# Patient Record
Sex: MALE | Race: WHITE
[De-identification: names, ages, dates, MRNs, and addresses within clinical notes are randomized per-mention and may not be internally consistent; named-entity substitution may affect disease eponyms.]

---

## 2020-01-25 ENCOUNTER — HOSPITAL ENCOUNTER (EMERGENCY)
Dept: HOSPITAL 56 - MW.ED | Age: 65
Discharge: HOME | End: 2020-01-25
Payer: COMMERCIAL

## 2020-01-25 DIAGNOSIS — J44.1: Primary | ICD-10-CM

## 2020-01-25 DIAGNOSIS — M54.6: ICD-10-CM

## 2020-01-25 DIAGNOSIS — F17.210: ICD-10-CM

## 2020-01-25 LAB
BUN SERPL-MCNC: 12 MG/DL (ref 7–18)
CHLORIDE SERPL-SCNC: 105 MMOL/L (ref 98–107)
CO2 SERPL-SCNC: 28.4 MMOL/L (ref 21–32)
GLUCOSE SERPL-MCNC: 121 MG/DL (ref 74–106)
POTASSIUM SERPL-SCNC: 4 MMOL/L (ref 3.5–5.1)
SODIUM SERPL-SCNC: 141 MMOL/L (ref 136–148)

## 2020-01-25 PROCEDURE — 93005 ELECTROCARDIOGRAM TRACING: CPT

## 2020-01-25 PROCEDURE — 36415 COLL VENOUS BLD VENIPUNCTURE: CPT

## 2020-01-25 PROCEDURE — 85025 COMPLETE CBC W/AUTO DIFF WBC: CPT

## 2020-01-25 PROCEDURE — 87804 INFLUENZA ASSAY W/OPTIC: CPT

## 2020-01-25 PROCEDURE — 94640 AIRWAY INHALATION TREATMENT: CPT

## 2020-01-25 PROCEDURE — 71045 X-RAY EXAM CHEST 1 VIEW: CPT

## 2020-01-25 PROCEDURE — 96374 THER/PROPH/DIAG INJ IV PUSH: CPT

## 2020-01-25 PROCEDURE — 99285 EMERGENCY DEPT VISIT HI MDM: CPT

## 2020-01-25 PROCEDURE — 84484 ASSAY OF TROPONIN QUANT: CPT

## 2020-01-25 PROCEDURE — 80053 COMPREHEN METABOLIC PANEL: CPT

## 2020-01-25 NOTE — EDM.PDOC
ED HPI GENERAL MEDICAL PROBLEM





- General


Chief Complaint: Respiratory Problem


Stated Complaint: TROUBLE BREATHING


Time Seen by Provider: 01/25/20 08:53


Source of Information: Reports: Patient





- History of Present Illness


INITIAL COMMENTS - FREE TEXT/NARRATIVE: 





HISTORY AND PHYSICAL:


History of present illness:


[Patient presents by private vehicle





He has significant smoking history since he was 16 he admits to a half to a 

full pack of cigarettes daily, he presents with shortness of breath no chest 

pain or diaphoresis however on deep inspiration he does have a left-sided back 

pain which coincides with or is reproduced by palpation of his left paraspinous 

muscles in the thoracic region.  Patient did receive a DuoNeb and Solu-Medrol 

which relieved the shortness of breath he did have audible wheezing on arrival 

and now has returned to baseline initial EKG did have some artifact as he was 

quite tight we did later repeat an EKG as he is much more comfortable this has 

returned without acute change.  Symptoms of shortness of breath and the pain on 

inspiration began yesterday a full 24 hours of symptoms prior to presentation 

troponin remains negative chest x-ray is clear other than scarring and COPD 

changes no acute process identified by radiology





At current he is at baseline and asymptomatic no fever nausea vomiting chills 

sweats no chest pain shortness of breath headache dizziness palpitation no 

bowel or urine symptoms no bradycardia pain radiation to arm neck or jaw at any 

time, 


]


Review of systems: 


As per history of present illness and below otherwise all systems reviewed and 

negative.


Past medical history: 


As per history of present illness and as reviewed below otherwise 

noncontributory.


Surgical history: 


As per history of present illness and as reviewed below otherwise 

noncontributory.


Social history: 


No reported history of drug or alcohol abuse.


Family history: 


As per history of present illness and as reviewed below otherwise 

noncontributory.





Physical exam:


HEENT: Atraumatic, normocephalic, pupils reactive, negative for conjunctival 

pallor or scleral icterus, mucous membranes moist, throat clear, neck supple, 

nontender, trachea midline.


Lungs: Clear to auscultation, breath sounds equal bilaterally, chest nontender.

  DuoNeb and Solu-Medrol


Heart: S1S2, regular, negative for clicks, rubs, or JVD.


Abdomen: Soft, nondistended, nontender. Negative for masses or 

hepatosplenomegaly. Negative for costovertebral tenderness.


Pelvis: Stable nontender.


Genitourinary: Deferred.


Rectal: Deferred.


Extremities: Atraumatic, negative for cords or calf pain. Neurovascular 

unremarkable.


Neuro: Awake, alert, oriented. Cranial nerves II through XII unremarkable. 

Cerebellum unremarkable. Motor and sensory unremarkable throughout. Exam 

nonfocal.





Diagnostics:


[cbc cmp ua trop


ekg


chest 1v


]


Therapeutics:


[duoneb


Solu-Medrol





prednisone


Azithromycin


Albuterol HFA








Offered observation admission and refused


Extended offer to return if symptoms persist or worsen or if new concerning 

symptoms develop otherwise follow-up with primary care in 2 weeks


]


Impression: 


[sob-old


Wheeze resolved


COPD exacerbation





]


Definitive disposition and diagnosis as appropriate pending reevaluation and 

review of above.


  ** L upper back


Pain Score (Numeric/FACES): 6





- Related Data


 Allergies











Allergy/AdvReac Type Severity Reaction Status Date / Time


 


No Known Allergies Allergy   Verified 01/25/20 08:54











Home Meds: 


 Home Meds





Multivitamin [Multivitamins] 1 tab PO DAILY 01/25/20 [History]











ED ROS GENERAL





- Review of Systems


Review Of Systems: See Below





ED EXAM, GENERAL





- Physical Exam


Exam: See Below





Course





- Vital Signs


Last Recorded V/S: 


 Last Vital Signs











Temp  97.0 F   01/25/20 08:52


 


Pulse  70   01/25/20 10:22


 


Resp  18   01/25/20 10:22


 


BP  136/82   01/25/20 10:22


 


Pulse Ox  94 L  01/25/20 10:22














- Orders/Labs/Meds


Orders: 


 Active Orders 24 hr











 Category Date Time Status


 


 EKG 12 Lead [EKG Documentation Completion] [RC] STAT Care  01/25/20 08:49 

Active


 


 EKG 12 Lead [EKG Documentation Completion] [RC] STAT Care  01/25/20 10:17 

Active


 


 RT Aerosol Therapy [RC] ASDIRECTED Care  01/25/20 08:49 Active











Labs: 


 Laboratory Tests











  01/25/20 01/25/20 Range/Units





  08:52 08:52 


 


WBC  6.21   (4.0-11.0)  K/uL


 


RBC  5.89   (4.50-5.90)  M/uL


 


Hgb  18.2 H   (13.0-17.0)  g/dL


 


Hct  53.0 H   (38.0-50.0)  %


 


MCV  90.0   (80.0-98.0)  fL


 


MCH  30.9   (27.0-32.0)  pg


 


MCHC  34.3   (31.0-37.0)  g/dL


 


RDW Std Deviation  44.7   (28.0-62.0)  fl


 


RDW Coeff of Dwight  14   (11.0-15.0)  %


 


Plt Count  222   (150-400)  K/uL


 


MPV  10.70   (7.40-12.00)  fL


 


Neut % (Auto)  60.7   (48.0-80.0)  %


 


Lymph % (Auto)  27.1   (16.0-40.0)  %


 


Mono % (Auto)  10.1   (0.0-15.0)  %


 


Eos % (Auto)  1.8   (0.0-7.0)  %


 


Baso % (Auto)  0.3   (0.0-1.5)  %


 


Neut # (Auto)  3.8   (1.4-5.7)  K/uL


 


Lymph # (Auto)  1.7   (0.6-2.4)  K/uL


 


Mono # (Auto)  0.6   (0.0-0.8)  K/uL


 


Eos # (Auto)  0.1   (0.0-0.7)  K/uL


 


Baso # (Auto)  0.0   (0.0-0.1)  K/uL


 


Nucleated RBC %  0.0   /100WBC


 


Nucleated RBCs #  0   K/uL


 


Sodium   141  (136-148)  mmol/L


 


Potassium   4.0  (3.5-5.1)  mmol/L


 


Chloride   105  ()  mmol/L


 


Carbon Dioxide   28.4  (21.0-32.0)  mmol/L


 


BUN   12  (7.0-18.0)  mg/dL


 


Creatinine   1.2  (0.8-1.3)  mg/dL


 


Est Cr Clr Drug Dosing   67.11  mL/min


 


Estimated GFR (MDRD)   > 60.0  ml/min


 


Glucose   121 H  ()  mg/dL


 


Calcium   9.3  (8.5-10.1)  mg/dL


 


Total Bilirubin   0.8  (0.2-1.0)  mg/dL


 


AST   14 L  (15-37)  IU/L


 


ALT   23  (14-63)  IU/L


 


Alkaline Phosphatase   87  ()  U/L


 


Troponin I   < 0.050  (0.000-0.056)  ng/mL


 


Total Protein   7.3  (6.4-8.2)  g/dL


 


Albumin   3.8  (3.4-5.0)  g/dL


 


Globulin   3.5  (2.6-4.0)  g/dL


 


Albumin/Globulin Ratio   1.1  (0.9-1.6)  











Meds: 


Medications














Discontinued Medications














Generic Name Dose Route Start Last Admin





  Trade Name Freyfn  PRN Reason Stop Dose Admin


 


Albuterol/Ipratropium  3 ml  01/25/20 08:49  01/25/20 08:53





  Duoneb 3.0-0.5 Mg/3 Ml  NEB  01/25/20 08:50  3 ml





  ONETIME ONE   Administration





     





     





     





     


 


Methylprednisolone Sodium Succinate  125 mg  01/25/20 09:26  01/25/20 09:35





  Solu-Medrol  IVPUSH  01/25/20 09:27  125 mg





  ONETIME ONE   Administration





     





     





     





     














Departure





- Departure


Time of Disposition: 11:08


Disposition: Home, Self-Care 01


Condition: Good


Clinical Impression: 


 COPD exacerbation








- Discharge Information


Forms:  ED Department Discharge


Additional Instructions: 


medication as prescribed


Return if symptoms persist or worsen or if new concerning symptoms develop


Follow-up with [facility] in 2 weeks sooner as needed





[facility] - Primary Care


[address] [place] [address]


Phone: [phone]


Fax: [phone]





The following information is given to patients seen in the emergency department 

who are being discharged to home. This information is to outline your options 

for follow-up care. We provide all patients seen in our emergency department 

with a follow-up referral.





The need for follow-up, as well as the timing and circumstances, are variable 

depending upon the specifics of your emergency department visit.





If you don't have a primary care physician on staff, we will provide you with a 

referral. We always advise you to contact your personal physician following an 

emergency department visit to inform them of the circumstance of the visit and 

for follow-up with them and/or the need for any referrals to a consulting 

specialist.





The emergency department will also refer you to a specialist when appropriate. 

This referral assures that you have the opportunity for follow-up care with a 

specialist. All of these measure are taken in an effort to provide you with 

optimal care, which includes your follow-up.





Under all circumstances we always encourage you to contact your private 

physician who remains a resource for coordinating your care. When calling for 

follow-up care, please make the office aware that this follow-up is from your 

recent emergency room visit. If for any reason you are refused follow-up, 

please contact the [facility] emergency department at [phone] 

and asked to speak to the emergency department charge nurse.











Sepsis Event Note





- Focused Exam


Vital Signs: 


 Vital Signs











  Temp Pulse Resp BP Pulse Ox


 


 01/25/20 10:22   70  18  136/82  94 L


 


 01/25/20 08:52  97.0 F  76  20  152/93 H  97











Date Exam was Performed: 01/25/20


Time Exam was Performed: 11:04





- My Orders


Last 24 Hours: 


My Active Orders





01/25/20 08:49


EKG 12 Lead [EKG Documentation Completion] [RC] STAT 


RT Aerosol Therapy [RC] ASDIRECTED 





01/25/20 10:17


EKG 12 Lead [EKG Documentation Completion] [RC] STAT 














- Assessment/Plan


Last 24 Hours: 


My Active Orders





01/25/20 08:49


EKG 12 Lead [EKG Documentation Completion] [RC] STAT 


RT Aerosol Therapy [RC] ASDIRECTED 





01/25/20 10:17


EKG 12 Lead [EKG Documentation Completion] [RC] STAT

## 2020-01-25 NOTE — CR
Chest: Portable view of the chest was obtained.

 

Comparison: No prior chest imaging is available.

 

Heart size is normal.  Tortuous thoracic aorta is seen.  Minimal 

atelectasis or scarring is noted within the right lateral costophrenic

 angle.  Lungs otherwise are clear with no acute parenchymal change.  

Bony structures are grossly intact.

 

Impression:

1.  Findings as noted above believed to be incidental.

2.  Nothing acute is appreciated on portable chest x-ray.

 

Diagnostic code #2

 

This report was dictated in [place] Standard Time